# Patient Record
Sex: FEMALE | Race: BLACK OR AFRICAN AMERICAN | Employment: UNEMPLOYED | URBAN - METROPOLITAN AREA
[De-identification: names, ages, dates, MRNs, and addresses within clinical notes are randomized per-mention and may not be internally consistent; named-entity substitution may affect disease eponyms.]

---

## 2017-06-16 ENCOUNTER — TELEPHONE (OUTPATIENT)
Dept: SURGERY | Age: 37
End: 2017-06-16

## 2017-06-21 ENCOUNTER — OFFICE VISIT (OUTPATIENT)
Dept: SURGERY | Age: 37
End: 2017-06-21

## 2017-06-21 VITALS
SYSTOLIC BLOOD PRESSURE: 128 MMHG | HEIGHT: 67 IN | TEMPERATURE: 98 F | DIASTOLIC BLOOD PRESSURE: 70 MMHG | HEART RATE: 76 BPM | WEIGHT: 259 LBS | BODY MASS INDEX: 40.65 KG/M2 | RESPIRATION RATE: 16 BRPM

## 2017-06-21 DIAGNOSIS — E66.01 MORBID OBESITY DUE TO EXCESS CALORIES (HCC): Primary | ICD-10-CM

## 2017-06-21 DIAGNOSIS — D50.9 IRON DEFICIENCY ANEMIA, UNSPECIFIED IRON DEFICIENCY ANEMIA TYPE: ICD-10-CM

## 2017-06-21 NOTE — PROGRESS NOTES
Pt ID confirmed    Weight Loss Metrics 6/21/2017 6/21/2017 9/30/2016 4/16/2016 1/3/2014 1/2/2014 1/16/2013   Pre op / Initial Wt 259 - - - - - -   Today's Wt - 259 lb 250 lb 282 lb 13.6 oz - 250 lb 245 lb   BMI - 40.57 kg/m2 40.35 kg/m2 44.29 kg/m2 44.3 kg/m2 - 38.36 kg/m2   Ideal Body Wt 140 - - - - - -   Excess Body Wt 119 - - - - - -   Goal Wt 164 - - - - - -   Wt loss to date 0 - - - - - -   % Wt Loss 0 - - - - - -   80% EBW 95.2 - - - - - -       Body mass index is 40.57 kg/(m^2).

## 2017-06-21 NOTE — PROGRESS NOTES
Initial Consultation for Bariatric Surgery     Barrett Hardy is a 30 y.o. female who comes into the office today for initial consultation for the surgical options for the treatment of morbid obesity. The patient initially identified obesity in childhood. Barrett Hardy has tried a variety of unsupervised weight-loss attempts including none, but has yet to meet with lasting success. Maximum weight lost on a diet is about 0 lbs, but that the weight always seems to return. Today, the patient is Height: 5' 7\" (170.2 cm) , Weight: 117.5 kg (259 lb) for a BMI of Body mass index is 40.57 kg/(m^2). Darcie Ramirez Maximum weight is 285lbs . It is due to their severe obesity, which is further complicated by  weight related arthopathies that the patient is now seeking out bariatric surgery. She cites her current challenges are: irregular eating, portion control, and a fondness for chips and soda. Weight Loss Metrics 6/21/2017 6/21/2017 9/30/2016 4/16/2016 1/3/2014 1/2/2014 1/16/2013   Pre op / Initial Wt 259 - - - - - -   Today's Wt - 259 lb 250 lb 282 lb 13.6 oz - 250 lb 245 lb   BMI - 40.57 kg/m2 40.35 kg/m2 44.29 kg/m2 44.3 kg/m2 - 38.36 kg/m2   Ideal Body Wt 140 - - - - - -   Excess Body Wt 119 - - - - - -   Goal Wt 164 - - - - - -   Wt loss to date 0 - - - - - -   % Wt Loss 0 - - - - - -   80% EBW 95.2 - - - - - -     Body mass index is 40.57 kg/(m^2). Past Medical History:   Diagnosis Date    Anemia     Heavy menstrual bleeding     Morbid obesity (HCC)        Past Surgical History:   Procedure Laterality Date    HX CHOLECYSTECTOMY      HX GI      gallbladder    HX GYN      HX TUBAL LIGATION             No Known Allergies    Social History   Substance Use Topics    Smoking status: Current Every Day Smoker     Packs/day: 1.00    Smokeless tobacco: Never Used    Alcohol use Yes      Comment: rarely, 2x per month beer or liquor     Was smoking 1ppd, has decreased down to 1 pack will last the week.   She is now using e-cigarette    No family history on file. ROS, positive where in bold:    General: fevers, chills, night sweats, fatigue, weight loss, weight gain    GI: abdominal pain, nausea, vomiting, change in bowel habits, hematochezia, melena, GERD, constipation    Integumentary: dermatitis or abnormal moles.     HEENT:  visual changes, vertigo, epistaxis, dysphagia, hoarseness    Cardiac: chest pain, palpitations, hypertension, edema, varicosities    Resp:  cough, shortness of breath, wheezing, hemoptysis, snoring, reactive airway disease    : hematuria, dysuria, frequency, urgency, nocturia, stress urinary incontinence    MSK: weakness, joint pain, arthritis    Endocrine: diabetes, thyroid disease, polyuria, polydipsia, polyphagia, poor wound healing, heat intolerance,cold intolerance    Lymph/Heme: anemia, bruising, history of blood transfusions    Neuro: dizziness, headache, fainting, seizures, stroke    Psychiatry:  anxiety, depression, psychosis      Physical Exam:  Visit Vitals    /70    Pulse 76    Temp 98 °F (36.7 °C)    Resp 16    Ht 5' 7\" (1.702 m)    Wt 117.5 kg (259 lb)    BMI 40.57 kg/m2       General: Well developed, well nourished 30 y.o. female in no acute distress  ENMT: normocephalic, atraumatic mouth:clear, no overt lesions, oral mucosa pink and moist  Neck: supple, no masses, no adenopathy or carotid bruits, trachea midline  Skin: warm, smooth, dry and well perfused  Respiratory: clear to auscultation bilaterally, no wheeze, rhonchi or rales, excursions normal and symmetrical  Cardiovascular: Regular rate and rhythm, no murmurs, clicks, gallops or rubs, no edema or varicosities  Gastrointestinal: soft, nontender, nondistended, normoactive bowel sounds, no hernias, no hepatosplenomegaly, easily palpable costal margins, gynecoid distribution  Musculoskeletal: warm, well-perfused, no tenderness or swelling, normal gait/station  Neuro: sensation and strength grossly intact and symmetrical  Psych: alert and oriented to person, place and time      Impression:    Benjy Garcia is a 30 y.o. female who is suffering from morbid obesity and its attendant comorbidities who would benefit from bariatric surgery. We have had an extensive discussion with regard to the risks, benefits and likely outcomes of both the sleeve and the gastric bypass. With regard to bypass, we have discussed the risks including bleeding, infection, need for reoperation, damage to surrounding structure, anastomotic leak, gastrogastric fistula ulcer and stricture, bowel obstruction due to internal hernia or adhesions, DVT, PE, heart attack, stroke and death. Patient also understands risks of inadequate weight loss, excess weight loss, vitamin insufficiency, protein malnutrition, excess skin, and loss of hair. We've discussed the restrictive nature of the sleeve gastrectomy. The patient understands the likelihood of losing approximately 65% of their excess weight in 12 to 18 months. Patient also understands the risks including but not limited to bleeding, infection, need for reoperation, leaks from staple line and strictures, bowel obstruction secondary to adhesions, DVT, PE, heart attack, stroke, and death. Patient also understands risks of inadequate weight loss, excess weight loss, vitamin insufficiency, protein malnutrition, excess skin, and loss of hair. We have reviewed the components of a successful postoperative course including requirement for a high protein, low carbohydrate diet, 60 oz a day of zero calorie liquids, daily vitamin supplementation, daily exercise, regular follow-up, and participation in support groups.  At this time we will enroll the patient in our bariatric program, undertake routine laboratory and radiographic evaluation, EKG, evaluation by nutritionist as well as psychologist and pending their satisfactory completion of the preop evaluation, plan to perform sleeve gastrectomy. Unfortunately, the patient is still smoking. They has been advised that they will need to completely quit smoking prior to surgery for a minimum of 3 months and that we will require a negative nicotine level to verify this.

## 2017-06-22 ENCOUNTER — DOCUMENTATION ONLY (OUTPATIENT)
Dept: BARIATRICS/WEIGHT MGMT | Age: 37
End: 2017-06-22

## 2017-06-22 ENCOUNTER — HOSPITAL ENCOUNTER (OUTPATIENT)
Dept: BARIATRICS/WEIGHT MGMT | Age: 37
Discharge: HOME | End: 2017-06-22

## 2017-06-22 DIAGNOSIS — E66.01 MORBID OBESITY WITH BMI OF 40.0-44.9, ADULT (HCC): Primary | ICD-10-CM

## 2017-06-22 NOTE — PROGRESS NOTES
[address] [place] [facility] [address] [facility], [address]    Patient's Name: Joaquin Nails   Age: 30 y.o. YOB: 1980   Sex: female    Date:   6/22/2017    Insurance:            Session: 1 of 6  Revision:   Surgeon:  Dr. Annalise Wilson    Height: 5 f 7 Weight:    259      Lbs. BMI: 40.6   Pounds Lost since last month: 0               Pounds Gained since last month: 0    Starting Weight: 259   Previous Months Weight: 259  Overall Pounds Lost: 0 Overall Pounds Gained: 0      Do you smoke? Yes    Alcohol intake:  Number of drinks at a time:  6  Number of times a week: 2-3    Class Guidelines    Guidelines are reviewed with patient at the start of every class. 1. Patient understands that weight loss trial classes must be consecutive. Patient understands if they miss a class, it is their responsibility to contact me to reschedule class. I will reach out to patient after their first no show. 2.  Patient understands the expectations that weight maintenance/weight loss is expected during the classes. Failure to demonstrate changes may result in one extra month of weight loss trial, followed by going back to see the surgeon. 3. Patient is also instructed to be doing their labs, blood work, psych visit, support group and any other test that the surgeon has used while they are working on their weight loss trial.    Other Pertinent Information:     Changes Made Since Last Class:     Eating Habits and Behaviors      Today we spent some time talking about the key diet principles. Patient was given ideas of protein-based snacks including deli meat, low fat cheese, yogurt, hummus and vegetables, protein drink, or a small handful of nuts. Patient was instructed to start cutting out the empty carbohydrate-based snacks that include chips, cookies, pretzels, crackers.     We talked about carbohydrates and starting to count daily carbohydrate intake to keep less than 100 grams per day. Patient is encouraged to fill up on meat and vegetables only. Patient was given a goal of 64 ounces of fluid a day. This needs to be non-carbonated, no caffeine, and no sugary drinks. Patient's current diet habits include: 2-3 meals per day. She is doing a lot of snacking in bed on chips, crackers, other carbohydrates. I talked with her about limiting where she eats to just the kitchen, but also sticking to protein-based snacks only. She is drinking a large amount of sweet tea and soda, which we addressed. We talked about behavior changes she will need to start working towards. Physical Activity/Exercise    Comments:  During class, I discussed with patient the importance of getting into an exercise routine. We talked about how strength training can help one's metabolism  Currently, patient is none for activity. Goals have been set. I have encouraged patient to purchase a pedometer to track their steps. Behavior Modification       Comments: In class, I did a power point on The 100-200 Mindless Margin. Studies show that the average person will not know if they ate 100 or 200 more or less calories than usual.  This is called the Mindless Margin. In other words, one can eat up to 20% fewer calories and not activate the deprivation and craving mechanism in the brain. Patient was instructed to make a modest daily 100-200 calorie reduction in certain things that the body won't notice. One, 100-200 calorie change and you will lose 10-20 pounds in a year. We talked about strategies that may help including Food rules: I will not eat the whole dessert, but rather just 4 bites to satisfy me (pre-op). Patient was given a check off list with a month's worth of days across the top and was encouraged to come up with a food, exercise, and behavior goal.  Every evening if the goal was achieved, they get a check in the box.   The goal is for it to be filled with check marks. One 100-calorie change that the patient is going to make is: Cut out bread.     Collin Irizarry, MS RD  6/22/2017

## 2017-06-27 DIAGNOSIS — E66.01 MORBID OBESITY WITH BMI OF 40.0-44.9, ADULT (HCC): Primary | ICD-10-CM

## 2017-07-27 ENCOUNTER — DOCUMENTATION ONLY (OUTPATIENT)
Dept: BARIATRICS/WEIGHT MGMT | Age: 37
End: 2017-07-27

## 2017-07-27 NOTE — PROGRESS NOTES
7/27/17:  Patient did not show for her 2nd weight loss trial class.   I attempted to call her, but the line just goes to the busy signal.    Starla Henriquez MS RD